# Patient Record
Sex: MALE | Race: WHITE | NOT HISPANIC OR LATINO | Employment: FULL TIME | URBAN - METROPOLITAN AREA
[De-identification: names, ages, dates, MRNs, and addresses within clinical notes are randomized per-mention and may not be internally consistent; named-entity substitution may affect disease eponyms.]

---

## 2023-12-30 ENCOUNTER — APPOINTMENT (OUTPATIENT)
Dept: CT IMAGING | Facility: CLINIC | Age: 58
End: 2023-12-30
Attending: EMERGENCY MEDICINE
Payer: COMMERCIAL

## 2023-12-30 ENCOUNTER — HOSPITAL ENCOUNTER (EMERGENCY)
Facility: CLINIC | Age: 58
Discharge: HOME | End: 2023-12-30
Attending: EMERGENCY MEDICINE | Admitting: EMERGENCY MEDICINE
Payer: COMMERCIAL

## 2023-12-30 VITALS
RESPIRATION RATE: 18 BRPM | OXYGEN SATURATION: 99 % | TEMPERATURE: 97.6 F | SYSTOLIC BLOOD PRESSURE: 168 MMHG | DIASTOLIC BLOOD PRESSURE: 95 MMHG | HEART RATE: 98 BPM

## 2023-12-30 DIAGNOSIS — K70.10 ALCOHOLIC HEPATITIS WITHOUT ASCITES (H): ICD-10-CM

## 2023-12-30 DIAGNOSIS — F10.920 ALCOHOLIC INTOXICATION WITHOUT COMPLICATION (H): ICD-10-CM

## 2023-12-30 DIAGNOSIS — F10.220 ALCOHOL DEPENDENCE WITH UNCOMPLICATED INTOXICATION (H): ICD-10-CM

## 2023-12-30 LAB
ALBUMIN SERPL BCG-MCNC: 4.6 G/DL (ref 3.5–5.2)
ALP SERPL-CCNC: 71 U/L (ref 40–150)
ALT SERPL W P-5'-P-CCNC: 155 U/L (ref 0–70)
ANION GAP SERPL CALCULATED.3IONS-SCNC: 16 MMOL/L (ref 7–15)
AST SERPL W P-5'-P-CCNC: 143 U/L (ref 0–45)
BASOPHILS # BLD AUTO: 0.1 10E3/UL (ref 0–0.2)
BASOPHILS NFR BLD AUTO: 1 %
BILIRUB SERPL-MCNC: 0.5 MG/DL
BUN SERPL-MCNC: 12.7 MG/DL (ref 8–23)
CALCIUM SERPL-MCNC: 8.9 MG/DL (ref 8.8–10.2)
CHLORIDE SERPL-SCNC: 102 MMOL/L (ref 98–107)
CREAT SERPL-MCNC: 0.84 MG/DL (ref 0.67–1.17)
DEPRECATED HCO3 PLAS-SCNC: 22 MMOL/L (ref 22–29)
EGFRCR SERPLBLD CKD-EPI 2021: >90 ML/MIN/1.73M2
EOSINOPHIL # BLD AUTO: 0.1 10E3/UL (ref 0–0.7)
EOSINOPHIL NFR BLD AUTO: 2 %
ERYTHROCYTE [DISTWIDTH] IN BLOOD BY AUTOMATED COUNT: 12.4 % (ref 10–15)
ETHANOL SERPL-MCNC: 0.29 G/DL
GLUCOSE SERPL-MCNC: 138 MG/DL (ref 70–99)
HCT VFR BLD AUTO: 49.2 % (ref 40–53)
HGB BLD-MCNC: 16.3 G/DL (ref 13.3–17.7)
HOLD SPECIMEN: NORMAL
IMM GRANULOCYTES # BLD: 0 10E3/UL
IMM GRANULOCYTES NFR BLD: 0 %
LIPASE SERPL-CCNC: 31 U/L (ref 13–60)
LYMPHOCYTES # BLD AUTO: 2.8 10E3/UL (ref 0.8–5.3)
LYMPHOCYTES NFR BLD AUTO: 48 %
MAGNESIUM SERPL-MCNC: 2 MG/DL (ref 1.7–2.3)
MCH RBC QN AUTO: 30.2 PG (ref 26.5–33)
MCHC RBC AUTO-ENTMCNC: 33.1 G/DL (ref 31.5–36.5)
MCV RBC AUTO: 91 FL (ref 78–100)
MONOCYTES # BLD AUTO: 0.4 10E3/UL (ref 0–1.3)
MONOCYTES NFR BLD AUTO: 7 %
NEUTROPHILS # BLD AUTO: 2.4 10E3/UL (ref 1.6–8.3)
NEUTROPHILS NFR BLD AUTO: 42 %
NRBC # BLD AUTO: 0 10E3/UL
NRBC BLD AUTO-RTO: 0 /100
PHOSPHATE SERPL-MCNC: 2.9 MG/DL (ref 2.5–4.5)
PLATELET # BLD AUTO: 172 10E3/UL (ref 150–450)
POTASSIUM SERPL-SCNC: 3.1 MMOL/L (ref 3.4–5.3)
PROT SERPL-MCNC: 8.4 G/DL (ref 6.4–8.3)
RBC # BLD AUTO: 5.4 10E6/UL (ref 4.4–5.9)
SODIUM SERPL-SCNC: 140 MMOL/L (ref 135–145)
WBC # BLD AUTO: 5.8 10E3/UL (ref 4–11)

## 2023-12-30 PROCEDURE — 70450 CT HEAD/BRAIN W/O DYE: CPT

## 2023-12-30 PROCEDURE — 250N000011 HC RX IP 250 OP 636: Performed by: EMERGENCY MEDICINE

## 2023-12-30 PROCEDURE — 250N000013 HC RX MED GY IP 250 OP 250 PS 637: Performed by: EMERGENCY MEDICINE

## 2023-12-30 PROCEDURE — 36415 COLL VENOUS BLD VENIPUNCTURE: CPT | Performed by: EMERGENCY MEDICINE

## 2023-12-30 PROCEDURE — 96365 THER/PROPH/DIAG IV INF INIT: CPT

## 2023-12-30 PROCEDURE — 82077 ASSAY SPEC XCP UR&BREATH IA: CPT | Performed by: EMERGENCY MEDICINE

## 2023-12-30 PROCEDURE — 82040 ASSAY OF SERUM ALBUMIN: CPT | Performed by: EMERGENCY MEDICINE

## 2023-12-30 PROCEDURE — 99285 EMERGENCY DEPT VISIT HI MDM: CPT | Mod: 25

## 2023-12-30 PROCEDURE — 84100 ASSAY OF PHOSPHORUS: CPT | Performed by: EMERGENCY MEDICINE

## 2023-12-30 PROCEDURE — 83735 ASSAY OF MAGNESIUM: CPT | Performed by: EMERGENCY MEDICINE

## 2023-12-30 PROCEDURE — 258N000003 HC RX IP 258 OP 636: Performed by: EMERGENCY MEDICINE

## 2023-12-30 PROCEDURE — 83690 ASSAY OF LIPASE: CPT | Performed by: EMERGENCY MEDICINE

## 2023-12-30 PROCEDURE — 250N000009 HC RX 250: Performed by: EMERGENCY MEDICINE

## 2023-12-30 PROCEDURE — 85025 COMPLETE CBC W/AUTO DIFF WBC: CPT | Performed by: EMERGENCY MEDICINE

## 2023-12-30 RX ORDER — POTASSIUM CHLORIDE 1500 MG/1
40 TABLET, EXTENDED RELEASE ORAL ONCE
Status: COMPLETED | OUTPATIENT
Start: 2023-12-30 | End: 2023-12-30

## 2023-12-30 RX ORDER — GABAPENTIN 300 MG/1
CAPSULE ORAL
Qty: 10 CAPSULE | Refills: 0 | Status: SHIPPED | OUTPATIENT
Start: 2023-12-30

## 2023-12-30 RX ORDER — DIAZEPAM 5 MG
10 TABLET ORAL ONCE
Status: COMPLETED | OUTPATIENT
Start: 2023-12-30 | End: 2023-12-30

## 2023-12-30 RX ADMIN — POTASSIUM CHLORIDE 40 MEQ: 1500 TABLET, EXTENDED RELEASE ORAL at 16:56

## 2023-12-30 RX ADMIN — THIAMINE HYDROCHLORIDE: 100 INJECTION, SOLUTION INTRAMUSCULAR; INTRAVENOUS at 16:39

## 2023-12-30 RX ADMIN — DIAZEPAM 10 MG: 5 TABLET ORAL at 19:25

## 2023-12-30 ASSESSMENT — ACTIVITIES OF DAILY LIVING (ADL)
ADLS_ACUITY_SCORE: 35
ADLS_ACUITY_SCORE: 35

## 2023-12-30 NOTE — ED PROVIDER NOTES
History     Chief Complaint:  Alcohol Intoxication       HPI   Arcadio Segura is a 58 year old male with a past medical history significant for hypertension, alcohol and its who presents to the ED via/accompanied by EMS and significant other with a chief complaint of alcohol intoxication and mechanical fall occurring earlier today.  Patient reports that he typically drinks approximately 325 mL of liquor per day.  Reports that he was drinking today and tripped and fell in the yard.  He denies hitting his head or loss of consciousness.  He denies extremity pain, chest pain, abdominal pain, nausea, vomiting, changes in urination or changes in bowel movements, recent illnesses, fevers, chills.      Independent Historian: history provided by the patient     Review of External Notes: See MDM    ROS:  Review of Systems  Full ROS completed and negative other than pertinent positives and negatives noted in HPI    Allergies:  No Known Allergies     Medications:    gabapentin (NEURONTIN) 300 MG capsule        Past Medical History:    No past medical history on file.    Past Surgical History:    No past surgical history on file.     Family History:    family history is not on file.    Social History:     PCP: No primary care provider on file.     Physical Exam   Patient Vitals for the past 24 hrs:   BP Temp Temp src Pulse Resp SpO2   12/30/23 1614 -- -- -- 98 18 99 %   12/30/23 1559 (!) 168/95 -- -- 101 -- --   12/30/23 1544 (!) 158/101 -- -- 100 -- 95 %   12/30/23 1541 (!) 158/101 97.6  F (36.4  C) Oral 100 16 100 %        Physical Exam  Constitutional: Well developed, clinically oxidated, nontox appearance  Head: Atraumatic.   Mouth/Throat: Oropharynx is clear and moist.   Neck:  no stridor, no C-spine tenderness  Eyes: no scleral icterus  Cardiovascular: Borderline regular tachycardia, 2+ bilat radial pulses  Pulmonary/Chest: nml resp effort, Clear BS bilat  Abdominal: ND, soft, NT, no rebound or guarding   Back: No T or  L-spine's process tenderness  Ext: Warm, well perfused, no edema  Neurological: A&O, symmetric facies, moves ext x4  Skin: Skin is warm and dry.   Psychiatric: Behavior is normal. Thought content normal.   Nursing note and vitals reviewed.    Emergency Department Course   ECG:  No results found for this or any previous visit.    Imaging:  CT Head w/o Contrast   Final Result   IMPRESSION:   1.  No acute intracranial abnormality.           Report per radiology unless otherwise specified in report or noted in MDM    Laboratory:  Labs Ordered and Resulted from Time of ED Arrival to Time of ED Departure   COMPREHENSIVE METABOLIC PANEL - Abnormal       Result Value    Sodium 140      Potassium 3.1 (*)     Carbon Dioxide (CO2) 22      Anion Gap 16 (*)     Urea Nitrogen 12.7      Creatinine 0.84      GFR Estimate >90      Calcium 8.9      Chloride 102      Glucose 138 (*)     Alkaline Phosphatase 71       (*)      (*)     Protein Total 8.4 (*)     Albumin 4.6      Bilirubin Total 0.5     ETHYL ALCOHOL LEVEL - Abnormal    Alcohol ethyl 0.29 (*)    LIPASE - Normal    Lipase 31     MAGNESIUM - Normal    Magnesium 2.0     PHOSPHORUS - Normal    Phosphorus 2.9     CBC WITH PLATELETS AND DIFFERENTIAL    WBC Count 5.8      RBC Count 5.40      Hemoglobin 16.3      Hematocrit 49.2      MCV 91      MCH 30.2      MCHC 33.1      RDW 12.4      Platelet Count 172      % Neutrophils 42      % Lymphocytes 48      % Monocytes 7      % Eosinophils 2      % Basophils 1      % Immature Granulocytes 0      NRBCs per 100 WBC 0      Absolute Neutrophils 2.4      Absolute Lymphocytes 2.8      Absolute Monocytes 0.4      Absolute Eosinophils 0.1      Absolute Basophils 0.1      Absolute Immature Granulocytes 0.0      Absolute NRBCs 0.0          Procedures       Emergency Department Course & Assessments:             Interventions:  Medications   sodium chloride 0.9 % 1,000 mL with Infuvite Adult 10 mL, thiamine 100 mg, folic acid 1 mg  infusion ( Intravenous Stopped 23 1744)   potassium chloride ER (KLOR-CON M) CR tablet 40 mEq (40 mEq Oral $Given 23 1656)   diazepam (VALIUM) tablet 10 mg (10 mg Oral $Given 23 1925)        Independent Interpretation (X-rays, CTs, rhythm strip):  See MDM    Consultations/Discussion of Management or Tests:  none     Social Determinants of Health affecting care:  See MDM    Disposition:  The patient was discharged to home.     Impression & Plan    CMS Diagnoses: none    Medical Decision Makin year old male presenting w/ intoxication    Social determinants affecting patient's health include: Alcohol dependence/abuse increasing risk for presentation to the emergency department for associated complaints     I reviewed medical records from family medicine office visit 2023    Patient's presentation consistent with acute alcohol intoxication and he appears clinically intoxicated on exam.  Lab significant for elevated LFTs and alcohol level consistent with alcoholic hepatitis and acute intoxication, hypokalemia.  Potassium repleted in ED.  Imaging significant for no intracranial abnormality.  Interventions as noted above.  Discussed offered transfer to detox the patient declined.  Prescriptions as noted below for management of impending withdrawal on outpatient basis.  They report they have chemical density resources and will pursue that she will program.  At this time I feel the patient is safe for discharge.  [facility] resources offered.  Recommendations given regarding follow up with PCP and return to the emergency department as needed for new or worsening symptoms.  Pt and significant other counseled on all results, diagnosis and disposition.  They are understanding and agreeable to plan. Patient discharged in stable condition.      Diagnosis:    ICD-10-CM    1. Alcoholic intoxication without complication (H24)  F10.920       2. Alcohol dependence with uncomplicated intoxication (H)  F10.220             Discharge Medications:  New Prescriptions    GABAPENTIN (NEURONTIN) 300 MG CAPSULE    Day 1 - 300 mg every 6 hours, Day 2 - 300 mg every 8 hours, Day 3 - 300 mg every 12 hours, Day 4 - 300 mg one dose        12/30/2023   Chetan Gray MD Vaughn, Christopher E, MD  12/30/23 1925

## 2023-12-31 NOTE — DISCHARGE INSTRUCTIONS
